# Patient Record
Sex: MALE | Race: WHITE | URBAN - METROPOLITAN AREA
[De-identification: names, ages, dates, MRNs, and addresses within clinical notes are randomized per-mention and may not be internally consistent; named-entity substitution may affect disease eponyms.]

---

## 2020-08-10 ENCOUNTER — OFFICE VISIT (OUTPATIENT)
Dept: VASCULAR SURGERY | Age: 60
End: 2020-08-10
Payer: MEDICAID

## 2020-08-10 VITALS
HEIGHT: 70 IN | RESPIRATION RATE: 18 BRPM | DIASTOLIC BLOOD PRESSURE: 80 MMHG | WEIGHT: 151 LBS | BODY MASS INDEX: 21.62 KG/M2 | SYSTOLIC BLOOD PRESSURE: 130 MMHG

## 2020-08-10 PROBLEM — I87.2 VENOUS INSUFFICIENCY OF BOTH LOWER EXTREMITIES: Status: ACTIVE | Noted: 2020-08-10

## 2020-08-10 PROCEDURE — 3017F COLORECTAL CA SCREEN DOC REV: CPT | Performed by: SURGERY

## 2020-08-10 PROCEDURE — G8420 CALC BMI NORM PARAMETERS: HCPCS | Performed by: SURGERY

## 2020-08-10 PROCEDURE — G8427 DOCREV CUR MEDS BY ELIG CLIN: HCPCS | Performed by: SURGERY

## 2020-08-10 PROCEDURE — 4004F PT TOBACCO SCREEN RCVD TLK: CPT | Performed by: SURGERY

## 2020-08-10 PROCEDURE — 99204 OFFICE O/P NEW MOD 45 MIN: CPT | Performed by: SURGERY

## 2020-08-10 RX ORDER — OXYCODONE AND ACETAMINOPHEN 10; 325 MG/1; MG/1
1 TABLET ORAL EVERY 4 HOURS PRN
COMMUNITY

## 2020-08-10 RX ORDER — OXYCODONE 18 MG/1
CAPSULE, EXTENDED RELEASE ORAL
COMMUNITY

## 2020-08-10 RX ORDER — GABAPENTIN 600 MG/1
600 TABLET ORAL 3 TIMES DAILY
COMMUNITY

## 2020-08-10 RX ORDER — ALBUTEROL SULFATE 90 UG/1
2 AEROSOL, METERED RESPIRATORY (INHALATION) EVERY 6 HOURS PRN
COMMUNITY

## 2020-08-10 NOTE — PROGRESS NOTES
Vascular Surgery Outpatient Consultation    Reason for Consult:  Bilateral LE pain    PCP : Zari Hernandez DO  Podiatrist :   Pulmonologist : MAURICE Suero    HISTORY OF PRESENT ILLNESS:    The patient is a 56 y.o. male who presents in regards to bilateral LE swelling, edema with associate venous stasis changes. The symptoms are left greater than right. Symptoms include pain, aching, fatigue, burning and edema typically worse as on feet more, at end of the day. Pain at its worst is a 10/10. He first noticed the swelling issues about 2016 and it has gotten progressively getting worse. He did take a water pill for a period of time which seemed to help with some swelling. His swelling did get better and they stopped it. The patient has used compression stockings in the past, the copper fit. He felt it was to tight. They do not have a hx of DVT in the past.      He has a history of adenocarcinoma of the lung. He had chemotherapy in the past.  He also had brain metastases which was treated with a gamma knife to the brain stem in July 2016. He has a left sided mediport. He does walk but also uses a wheelchair. He has a lot of difficulty with walking due to pain in his legs. He has chronic back pain which radiates down his legs also which significantly affects his ability to walk.        ROS : All others Negative if blank [], Positive if [x]  General Urinary   [] Fevers [] Hematuria   [] Chills [] Dysuria   [] Weight Loss Vascular   Skin [] Claudication   [] Tissue Loss [] Rest Pain   Eyes Neurologic   [x] Wears Glasses/Contacts [] Stroke/TIA   [] Vision Changes [] Focal weakness   Respiratory [] Slurred Speech    [x] Shortness of breath ENT   Cardiovascular [] Difficulty swallowing   [] Chest Pain Endocrine    [x] Shortness of breath with exertion - on O2 [x] Increased Thirst   Gastrointestinal    [] Abdominal Pain    [] Melena   [] Hematochezia         Past Medical History:        Diagnosis Date    Arthritis     Blood circulation, collateral     Cancer (HCC)     LUNGS, METS TO BRAIN, LIVER, LEGS DX 2016    Chronic kidney disease     COPD (chronic obstructive pulmonary disease) (HCC)     GERD (gastroesophageal reflux disease)     Removed 2 foot if instine    Liver disease     Mets to livewr, lung primary    Lymphadenopathy     Pneumonia     Cancer     Past Surgical History:        Procedure Laterality Date    ABDOMEN SURGERY      APPENDECTOMY      COLON SURGERY  2011    twisted bowel 22 inches small bowel remove    COLONOSCOPY      DILATATION, ESOPHAGUS      ENDOSCOPY, COLON, DIAGNOSTIC      TONSILLECTOMY       Current Medications:   Current Outpatient Medications   Medication Sig Dispense Refill    oxyCODONE-acetaminophen (PERCOCET)  MG per tablet Take 1 tablet by mouth every 4 hours as needed for Pain.  albuterol sulfate HFA (PROAIR HFA) 108 (90 Base) MCG/ACT inhaler Inhale 2 puffs into the lungs every 6 hours as needed for Wheezing      oxyCODONE ER (XTAMPZA ER) 18 MG C12A Take by mouth.  gabapentin (NEURONTIN) 600 MG tablet Take 600 mg by mouth 3 times daily.  magnesium chloride (MAG DELAY) 535 (64 MG) MG TBCR extended release tablet Take 1 tablet by mouth 3 times daily 90 tablet 0    calcitRIOL (ROCALTROL) 0.25 MCG capsule Take 1 capsule by mouth daily 30 capsule 0    vitamin D (ERGOCALCIFEROL) 25577 UNITS capsule Take 1 capsule by mouth once a week 4 capsule 0    Fluticasone Furoate-Vilanterol 200-25 MCG/INH AEPB Inhale into the lungs daily Do am      B Complex Vitamins (VITAMIN B COMPLEX PO) Take by mouth      Multiple Vitamins-Minerals (MULTIVITAMIN ADULT PO) Take by mouth      Ascorbic Acid (VITAMIN C) 500 MG tablet Take 500 mg by mouth daily       No current facility-administered medications for this visit. Allergies:  Patient has no known allergies.   Social History     Socioeconomic History    Marital status: Awake, alert, cooperative  PSYCHIATRIC :  Oriented to time, place and person     Appropriate insight to disease process  EYES: Lids and lashes normal  ENT:  External ears and nose without lesions   Hearing deficits not noted  NECK: Supple, symmetrical, trachea midline   Thyroid goiter not appreciated   Carotid bruit notnoted  LUNGS:  No increased work of breathing                 Clear to auscultation  CARDIOVASCULAR:  regular rate and rhythm   ABDOMEN:  soft, non-distended, non-tender   Hernias notnoted   Aorta is not palpable  Lymphatics : Cervical lymphadenopathy notnoted     Femoral lymphadenopathy nonoted  SKIN:   Normal skin color   Texture and turgor normal, no induration  EXTREMITIES:   R UE 5/5 strength   No cyanosis noted in nail beds  L UE 5/5 strength   No cyanosis noted in nail beds  R LE Edema present   Varicose veins absent   L LE Edema present   Varicose veins absent    Significant amount of dead skin bilateral calves which comes off easily   No open wounds  Bilateral feet swelling worse than legs swelling  R femoral 2+ L femoral 2+   R dorsalis pedis biphasic L dorsalis pedis biphasic   R posterior tibial biphasic L posterior tibial biphasic     RADIOLOGY:    A/PSymptomatic Venous insufficiency of Bilateral LE   · Etiology is likely associated with venous reflux  · I explained to them the pathophysiology of venous reflux and the symptoms associated with it including swelling, pain, edema, heaviness, and even ulceration  · Elevate Bilateral LE while in bed or sitting - difficult because of back issues  · Compression stockings high 8-15 mm hg wear daily - use copper fits which he already have  · Discussed how this is the first line of therapy  · They understand even if surgical intervention was felt to be appropriate in the future they would need to wear compression stockings lifetime  · F/U as outpatient in 2 months  · Call if patient develops worsening of swelling or wounds  · All ?s answered  · Part of his pain is likely caused by his chronic back issues    Meredith Torrez

## 2020-10-09 ENCOUNTER — TELEPHONE (OUTPATIENT)
Dept: VASCULAR SURGERY | Age: 60
End: 2020-10-09

## 2020-10-09 NOTE — TELEPHONE ENCOUNTER
Patient cancelled appt on 10-12-20 with Dr. Tha Sebastian. He does not wish to reschedule at this time.

## 2022-12-30 ENCOUNTER — HOSPITAL ENCOUNTER (EMERGENCY)
Age: 62
Discharge: HOME | End: 2022-12-30
Attending: EMERGENCY MEDICINE
Payer: MEDICAID

## 2022-12-30 ENCOUNTER — APPOINTMENT (OUTPATIENT)
Dept: GENERAL RADIOLOGY | Age: 62
End: 2022-12-30
Payer: MEDICAID

## 2022-12-30 ENCOUNTER — APPOINTMENT (OUTPATIENT)
Dept: CT IMAGING | Age: 62
End: 2022-12-30
Payer: MEDICAID

## 2022-12-30 VITALS
HEART RATE: 90 BPM | RESPIRATION RATE: 16 BRPM | TEMPERATURE: 98.4 F | SYSTOLIC BLOOD PRESSURE: 126 MMHG | OXYGEN SATURATION: 98 % | DIASTOLIC BLOOD PRESSURE: 78 MMHG

## 2022-12-30 DIAGNOSIS — U07.1 COVID: Primary | ICD-10-CM

## 2022-12-30 DIAGNOSIS — J44.1 COPD EXACERBATION (HCC): ICD-10-CM

## 2022-12-30 LAB
ADENOVIRUS BY PCR: NOT DETECTED
ALBUMIN SERPL-MCNC: 4.5 G/DL (ref 3.5–5.2)
ALP BLD-CCNC: 86 U/L (ref 40–129)
ALT SERPL-CCNC: 12 U/L (ref 0–40)
ANION GAP SERPL CALCULATED.3IONS-SCNC: 14 MMOL/L (ref 7–16)
AST SERPL-CCNC: 24 U/L (ref 0–39)
BASOPHILS ABSOLUTE: 0.07 E9/L (ref 0–0.2)
BASOPHILS RELATIVE PERCENT: 1.7 % (ref 0–2)
BILIRUB SERPL-MCNC: 0.3 MG/DL (ref 0–1.2)
BORDETELLA PARAPERTUSSIS BY PCR: NOT DETECTED
BORDETELLA PERTUSSIS BY PCR: NOT DETECTED
BUN BLDV-MCNC: 29 MG/DL (ref 6–23)
CALCIUM SERPL-MCNC: 10.2 MG/DL (ref 8.6–10.2)
CHLAMYDOPHILIA PNEUMONIAE BY PCR: NOT DETECTED
CHLORIDE BLD-SCNC: 96 MMOL/L (ref 98–107)
CO2: 27 MMOL/L (ref 22–29)
CORONAVIRUS 229E BY PCR: NOT DETECTED
CORONAVIRUS HKU1 BY PCR: NOT DETECTED
CORONAVIRUS NL63 BY PCR: NOT DETECTED
CORONAVIRUS OC43 BY PCR: NOT DETECTED
CREAT SERPL-MCNC: 1.5 MG/DL (ref 0.7–1.2)
EKG ATRIAL RATE: 80 BPM
EKG P AXIS: 56 DEGREES
EKG P-R INTERVAL: 148 MS
EKG Q-T INTERVAL: 348 MS
EKG QRS DURATION: 86 MS
EKG QTC CALCULATION (BAZETT): 401 MS
EKG R AXIS: 3 DEGREES
EKG T AXIS: 66 DEGREES
EKG VENTRICULAR RATE: 80 BPM
EOSINOPHILS ABSOLUTE: 0 E9/L (ref 0.05–0.5)
EOSINOPHILS RELATIVE PERCENT: 0.5 % (ref 0–6)
GFR SERPL CREATININE-BSD FRML MDRD: 52 ML/MIN/1.73
GLUCOSE BLD-MCNC: 92 MG/DL (ref 74–99)
HCT VFR BLD CALC: 40.7 % (ref 37–54)
HEMOGLOBIN: 13.3 G/DL (ref 12.5–16.5)
HUMAN METAPNEUMOVIRUS BY PCR: NOT DETECTED
HUMAN RHINOVIRUS/ENTEROVIRUS BY PCR: NOT DETECTED
INFLUENZA A BY PCR: NOT DETECTED
INFLUENZA B BY PCR: NOT DETECTED
LACTIC ACID: 1.5 MMOL/L (ref 0.5–2.2)
LYMPHOCYTES ABSOLUTE: 0.18 E9/L (ref 1.5–4)
LYMPHOCYTES RELATIVE PERCENT: 4.4 % (ref 20–42)
MAGNESIUM: 1.7 MG/DL (ref 1.6–2.6)
MCH RBC QN AUTO: 29.4 PG (ref 26–35)
MCHC RBC AUTO-ENTMCNC: 32.7 % (ref 32–34.5)
MCV RBC AUTO: 89.8 FL (ref 80–99.9)
METAMYELOCYTES RELATIVE PERCENT: 0.9 % (ref 0–1)
MONOCYTES ABSOLUTE: 0.48 E9/L (ref 0.1–0.95)
MONOCYTES RELATIVE PERCENT: 11.3 % (ref 2–12)
MYCOPLASMA PNEUMONIAE BY PCR: NOT DETECTED
NEUTROPHILS ABSOLUTE: 3.65 E9/L (ref 1.8–7.3)
NEUTROPHILS RELATIVE PERCENT: 81.7 % (ref 43–80)
PARAINFLUENZA VIRUS 1 BY PCR: NOT DETECTED
PARAINFLUENZA VIRUS 2 BY PCR: NOT DETECTED
PARAINFLUENZA VIRUS 3 BY PCR: NOT DETECTED
PARAINFLUENZA VIRUS 4 BY PCR: NOT DETECTED
PDW BLD-RTO: 13.6 FL (ref 11.5–15)
PLATELET # BLD: 243 E9/L (ref 130–450)
PMV BLD AUTO: 9.1 FL (ref 7–12)
POTASSIUM SERPL-SCNC: 4.8 MMOL/L (ref 3.5–5)
PRO-BNP: 970 PG/ML (ref 0–125)
RBC # BLD: 4.53 E12/L (ref 3.8–5.8)
REASON FOR REJECTION: NORMAL
REJECTED TEST: NORMAL
RESPIRATORY SYNCYTIAL VIRUS BY PCR: NOT DETECTED
SARS-COV-2, PCR: DETECTED
SODIUM BLD-SCNC: 137 MMOL/L (ref 132–146)
TOTAL PROTEIN: 7.7 G/DL (ref 6.4–8.3)
TROPONIN, HIGH SENSITIVITY: 21 NG/L (ref 0–11)
TROPONIN, HIGH SENSITIVITY: 24 NG/L (ref 0–11)
WBC # BLD: 4.4 E9/L (ref 4.5–11.5)

## 2022-12-30 PROCEDURE — 94664 DEMO&/EVAL PT USE INHALER: CPT

## 2022-12-30 PROCEDURE — 83880 ASSAY OF NATRIURETIC PEPTIDE: CPT

## 2022-12-30 PROCEDURE — 80053 COMPREHEN METABOLIC PANEL: CPT

## 2022-12-30 PROCEDURE — 6370000000 HC RX 637 (ALT 250 FOR IP): Performed by: STUDENT IN AN ORGANIZED HEALTH CARE EDUCATION/TRAINING PROGRAM

## 2022-12-30 PROCEDURE — 83735 ASSAY OF MAGNESIUM: CPT

## 2022-12-30 PROCEDURE — 6360000004 HC RX CONTRAST MEDICATION: Performed by: RADIOLOGY

## 2022-12-30 PROCEDURE — 84484 ASSAY OF TROPONIN QUANT: CPT

## 2022-12-30 PROCEDURE — 71275 CT ANGIOGRAPHY CHEST: CPT

## 2022-12-30 PROCEDURE — 99285 EMERGENCY DEPT VISIT HI MDM: CPT

## 2022-12-30 PROCEDURE — 36415 COLL VENOUS BLD VENIPUNCTURE: CPT

## 2022-12-30 PROCEDURE — 85025 COMPLETE CBC W/AUTO DIFF WBC: CPT

## 2022-12-30 PROCEDURE — 71046 X-RAY EXAM CHEST 2 VIEWS: CPT

## 2022-12-30 PROCEDURE — 93005 ELECTROCARDIOGRAM TRACING: CPT | Performed by: PHYSICIAN ASSISTANT

## 2022-12-30 PROCEDURE — 83605 ASSAY OF LACTIC ACID: CPT

## 2022-12-30 PROCEDURE — 0202U NFCT DS 22 TRGT SARS-COV-2: CPT

## 2022-12-30 RX ORDER — PREDNISONE 50 MG/1
50 TABLET ORAL DAILY
Qty: 5 TABLET | Refills: 0 | Status: SHIPPED | OUTPATIENT
Start: 2022-12-30 | End: 2023-01-04

## 2022-12-30 RX ORDER — IPRATROPIUM BROMIDE AND ALBUTEROL SULFATE 2.5; .5 MG/3ML; MG/3ML
3 SOLUTION RESPIRATORY (INHALATION) ONCE
Status: COMPLETED | OUTPATIENT
Start: 2022-12-30 | End: 2022-12-30

## 2022-12-30 RX ORDER — PREDNISONE 20 MG/1
50 TABLET ORAL ONCE
Status: COMPLETED | OUTPATIENT
Start: 2022-12-30 | End: 2022-12-30

## 2022-12-30 RX ADMIN — IOPAMIDOL 60 ML: 755 INJECTION, SOLUTION INTRAVENOUS at 19:08

## 2022-12-30 RX ADMIN — IPRATROPIUM BROMIDE AND ALBUTEROL SULFATE 3 AMPULE: 2.5; .5 SOLUTION RESPIRATORY (INHALATION) at 14:35

## 2022-12-30 RX ADMIN — PREDNISONE 50 MG: 20 TABLET ORAL at 15:57

## 2022-12-30 ASSESSMENT — ENCOUNTER SYMPTOMS
RHINORRHEA: 0
SINUS PAIN: 0
ANAL BLEEDING: 0
ABDOMINAL DISTENTION: 0
EYE DISCHARGE: 0
BACK PAIN: 0
SINUS PRESSURE: 0
ABDOMINAL PAIN: 0
CONSTIPATION: 0
SHORTNESS OF BREATH: 1
VOMITING: 0
CHEST TIGHTNESS: 0
DIARRHEA: 0
COUGH: 1
NAUSEA: 0

## 2022-12-30 NOTE — ED PROVIDER NOTES
HPI     Patient is a 57 y.o. male presents with a chief complaint of shortness of breath  This has been occurring for one day. Patient states that it gets better with nothing. Patient states that it gets worse with nothing. Patient states that it is moderate in severity. Patient states it was acute in onset. Patient presents with shortness of breath. Patient was recently exposed to [employer] over the holiday. Patient states that he has stage IV lung cancer. Patient notes that he is in remission. Patient is chronically on 4 L. Not coughing up blood. New cough. Patient has no changes in urinary or bowel habits. Patient denies any abdominal pain. Patient has mild chest pain earlier that is completely resolved. Review of Systems   Constitutional:  Negative for activity change, appetite change, fatigue and fever. HENT:  Negative for congestion, rhinorrhea, sinus pressure and sinus pain. Eyes:  Negative for discharge. Respiratory:  Positive for cough and shortness of breath. Negative for chest tightness. Cardiovascular:  Positive for chest pain. Negative for palpitations. Gastrointestinal:  Negative for abdominal distention, abdominal pain, anal bleeding, constipation, diarrhea, nausea and vomiting. Endocrine: Negative for polydipsia and polyuria. Genitourinary:  Negative for decreased urine volume, difficulty urinating, enuresis, flank pain, frequency and urgency. Musculoskeletal:  Negative for arthralgias, back pain and neck stiffness. Skin:  Negative for rash and wound. Neurological:  Negative for dizziness, weakness, light-headedness and headaches. Psychiatric/Behavioral:  Negative for agitation, behavioral problems and confusion. Physical Exam  Vitals and nursing note reviewed. Constitutional:       Appearance: He is well-developed. HENT:      Head: Normocephalic and atraumatic.    Eyes:      Conjunctiva/sclera: Conjunctivae normal.   Cardiovascular:      Rate and Rhythm: Normal rate and regular rhythm. Heart sounds: Normal heart sounds. No murmur heard. Pulmonary:      Effort: Pulmonary effort is normal. No respiratory distress. Breath sounds: Normal breath sounds. No wheezing or rales. Abdominal:      General: Bowel sounds are normal.      Palpations: Abdomen is soft. Tenderness: There is no abdominal tenderness. There is no guarding or rebound. Musculoskeletal:         General: No tenderness or deformity. Cervical back: Normal range of motion and neck supple. Skin:     General: Skin is warm and dry. Neurological:      Mental Status: He is alert and oriented to person, place, and time. Cranial Nerves: No cranial nerve deficit. Coordination: Coordination normal.        Procedures     MDM     Amount and/or Complexity of Data Reviewed  Decide to obtain previous medical records or to obtain history from someone other than the patient: yes         ED Course as of 12/31/22 0958   Fri Dec 30, 2022   1316 EKG: This EKG is signed and interpreted by me, Dr. Charley Horton MD    Rate: 80  Rhythm: Sinus  Interpretation: Normal sinus rhythm, normal OH interval, normal QRS, normal axis, normal QT interval, no acute ST or T wave changes  Comparison: no previous EKG available   [JA]   1617 Patient is a 25-year-old male presenting with a 1 day history of cough and shortness of breath. He has a remote history of lung cancer, currently in remission. He is on 4 L of oxygen at baseline. He denies any chest pain, hemoptysis, syncope, abdominal pain, emesis, diarrhea. Patient is positive for COVID-19. He was hemodynamically stable, saturating wellon oxygen in no acute respiratory distress on my examination. Saturating well on his baseline oxygen. Work-up is pending. [JA]   2106 Discussed results with patient, including finding of small pleural effusion. Patient is aware of this and states his pulmonologist is aware of this as well.   He has pulse ox at home and was instructed to monitor his SPO2. He was discharged home with strict return precautions. [AP]      ED Course User Index  [AP] Sparkle Schaeffer MD  [JA] Sharmin Silverman MD      Patient is a 57 y.o. male presenting with shortness of breath. Patient has a history of lung cancer. Patient will get a CTA pulm. Patient's x-ray was benign. EKG was benign. Patient was on his home liter oxygen. Patient was ambulated on oxygen and did not become hypoxic. Patient's delta troponin was 3. Patient was given duo nebs and prednisone. Patient did have improvement of symptoms. Patient was signed off to oncoming physician. Patient CT of the chest showed some emphysema as well as pulmonary nodules. Patient will be discharged home    Patient was given return precautions. Labs were interpreted by me. Patient will follow up with their primary care provider. Patient is agreeable to this plan. Patient has remained stable throughout their stay in the ED. Patient was seen and evaluated by myself and my attending Sharmin Silverman MD. Assessment and Plan discussed with attending provider, please see attestation for final plan of care. This note was done using dictation software and there may be some grammatical errors associated with this. Jean Pierre Salinas MD      ED Course as of 12/31/22 1000   Fri Dec 30, 2022   1316 EKG: This EKG is signed and interpreted by me, Dr. Ruthie Argueta MD    Rate: 80  Rhythm: Sinus  Interpretation: Normal sinus rhythm, normal RI interval, normal QRS, normal axis, normal QT interval, no acute ST or T wave changes  Comparison: no previous EKG available   [JA]   1613 Patient is a 22-year-old male presenting with a 1 day history of cough and shortness of breath. He has a remote history of lung cancer, currently in remission. He is on 4 L of oxygen at baseline. He denies any chest pain, hemoptysis, syncope, abdominal pain, emesis, diarrhea.   Patient is positive for COVID-19. He was hemodynamically stable, saturating wellon oxygen in no acute respiratory distress on my examination. Saturating well on his baseline oxygen. Work-up is pending. [JA]   2435 Discussed results with patient, including finding of small pleural effusion. Patient is aware of this and states his pulmonologist is aware of this as well. He has pulse ox at home and was instructed to monitor his SPO2. He was discharged home with strict return precautions. [AP]      ED Course User Index  [AP] Jie Monsivais MD  [JA] Sofie Maynard MD       --------------------------------------------- PAST HISTORY ---------------------------------------------  Past Medical History:  has a past medical history of Arthritis, Blood circulation, collateral, Cancer ([facility] [address] Chronic kidney disease, [facility] GERD (gastroesophageal reflux disease), Liver disease, Lymphadenopathy, and Pneumonia. Past Surgical History:  has a past surgical history that includes Colon surgery (2011); Colonoscopy; Tonsillectomy; Abdomen surgery; Appendectomy; Endoscopy, colon, diagnostic; and Dilatation, esophagus. Social History:  reports that he has been smoking. He has a 40.00 pack-year smoking history. He has never used smokeless tobacco. He reports current alcohol use of about 28.0 standard drinks per week. He reports that he does not use drugs. Family History: family history is not on file. The patients home medications have been reviewed. Allergies: Patient has no known allergies.     -------------------------------------------------- RESULTS -------------------------------------------------  Labs:  Results for orders placed or performed during the hospital encounter of 12/30/22   Respiratory Panel, Molecular, with COVID-19 (Restricted: peds pts or suitable admitted adults)   Result Value Ref Range    Adenovirus by PCR Not Detected Not Detected    Bordetella parapertussis by PCR Not Detected Not Detected    Bordetella pertussis by PCR Not Detected Not Detected    Chlamydophilia pneumoniae by PCR Not Detected Not Detected    Coronavirus 229E by PCR Not Detected Not Detected    Coronavirus HKU1 by PCR Not Detected Not Detected    Coronavirus NL63 by PCR Not Detected Not Detected    Coronavirus OC43 by PCR Not Detected Not Detected    SARS-CoV-2, PCR DETECTED (A) Not Detected    Human Metapneumovirus by PCR Not Detected Not Detected    Human Rhinovirus/Enterovirus by PCR Not Detected Not Detected    Influenza A by PCR Not Detected Not Detected    Influenza B by PCR Not Detected Not Detected    Mycoplasma pneumoniae by PCR Not Detected Not Detected    Parainfluenza Virus 1 by PCR Not Detected Not Detected    Parainfluenza Virus 2 by PCR Not Detected Not Detected    Parainfluenza Virus 3 by PCR Not Detected Not Detected    Parainfluenza Virus 4 by PCR Not Detected Not Detected    Respiratory Syncytial Virus by PCR Not Detected Not Detected   CBC with Auto Differential   Result Value Ref Range    WBC 4.4 (L) 4.5 - 11.5 E9/L    RBC 4.53 3.80 - 5.80 E12/L    Hemoglobin 13.3 12.5 - 16.5 g/dL    Hematocrit 40.7 37.0 - 54.0 %    MCV 89.8 80.0 - 99.9 fL    MCH 29.4 26.0 - 35.0 pg    MCHC 32.7 32.0 - 34.5 %    RDW 13.6 11.5 - 15.0 fL    Platelets 402 073 - 180 E9/L    MPV 9.1 7.0 - 12.0 fL    Neutrophils % 81.7 (H) 43.0 - 80.0 %    Lymphocytes % 4.4 (L) 20.0 - 42.0 %    Monocytes % 11.3 2.0 - 12.0 %    Eosinophils % 0.5 0.0 - 6.0 %    Basophils % 1.7 0.0 - 2.0 %    Neutrophils Absolute 3.65 1.80 - 7.30 E9/L    Lymphocytes Absolute 0.18 (L) 1.50 - 4.00 E9/L    Monocytes Absolute 0.48 0.10 - 0.95 E9/L    Eosinophils Absolute 0.00 (L) 0.05 - 0.50 E9/L    Basophils Absolute 0.07 0.00 - 0.20 E9/L    Metamyelocytes Relative 0.9 0.0 - 1.0 %   Comprehensive Metabolic Panel   Result Value Ref Range    Sodium 137 132 - 146 mmol/L    Potassium 4.8 3.5 - 5.0 mmol/L    Chloride 96 (L) 98 - 107 mmol/L    CO2 27 22 - 29 mmol/L    Anion Gap 14 7 - 16 mmol/L    Glucose 92 74 - 99 mg/dL    BUN 29 (H) 6 - 23 mg/dL    Creatinine 1.5 (H) 0.7 - 1.2 mg/dL    Est, Glom Filt Rate 52 >=60 mL/min/1.73    Calcium 10.2 8.6 - 10.2 mg/dL    Total Protein 7.7 6.4 - 8.3 g/dL    Albumin 4.5 3.5 - 5.2 g/dL    Total Bilirubin 0.3 0.0 - 1.2 mg/dL    Alkaline Phosphatase 86 40 - 129 U/L    ALT 12 0 - 40 U/L    AST 24 0 - 39 U/L   Troponin   Result Value Ref Range    Troponin, High Sensitivity 24 (H) 0 - 11 ng/L   Lactic Acid   Result Value Ref Range    Lactic Acid 1.5 0.5 - 2.2 mmol/L   Brain Natriuretic Peptide   Result Value Ref Range    Pro- (H) 0 - 125 pg/mL   Magnesium   Result Value Ref Range    Magnesium 1.7 1.6 - 2.6 mg/dL   SPECIMEN REJECTION   Result Value Ref Range    Rejected Test covrg     Reason for Rejection see below    Troponin   Result Value Ref Range    Troponin, High Sensitivity 21 (H) 0 - 11 ng/L   EKG 12 Lead   Result Value Ref Range    Ventricular Rate 80 BPM    Atrial Rate 80 BPM    P-R Interval 148 ms    QRS Duration 86 ms    Q-T Interval 348 ms    QTc Calculation (Bazett) 401 ms    P Axis 56 degrees    R Axis 3 degrees    T Axis 66 degrees       Radiology:  CTA PULMONARY W CONTRAST   Final Result   No evidence of pulmonary embolism. Severe emphysema and chronic changes with probably a state resection is prior   resection right lung apex. No suspicious pulmonary nodule or mass to suggest   recurrent metastatic disease however the minimal septal thickening with   loculated small right and trace to small left pleural effusions may be   related to a interstitial edema pattern or fluid overload. XR CHEST (2 VW)   Final Result   No acute process. Severe scoliosis of the spine. Infusion port in place.              ------------------------- NURSING NOTES AND VITALS REVIEWED ---------------------------  Date / Time Roomed:  12/30/2022 11:58 AM  ED Bed Assignment:  33/05    The nursing notes within the ED encounter and vital signs as below have been reviewed. /78   Pulse 90   Temp 98.4 °F (36.9 °C)   Resp 16   SpO2 98%   Oxygen Saturation Interpretation: Normal      ------------------------------------------ PROGRESS NOTES ------------------------------------------  10:00 AM EST  I have spoken with the patient and family if present and discussed todays results, in addition to providing specific details for the plan of care and counseling regarding the diagnosis and prognosis. Their questions are answered at this time and they are agreeable with the plan. I discussed at length with them reasons for immediate return here for re evaluation. They will followup with their primary care provider by calling their office as soon as possible.      --------------------------------- ADDITIONAL PROVIDER NOTES ---------------------------------  At this time the patient is without objective evidence of an acute process requiring hospitalization or inpatient management. They have remained hemodynamically stable throughout their entire ED visit and are stable for discharge with outpatient follow-up. The plan has been discussed in detail and they are aware of the specific conditions for emergent return, as well as the importance of follow-up. Discharge Medication List as of 12/30/2022  8:56 PM        START taking these medications    Details   predniSONE (DELTASONE) 50 MG tablet Take 1 tablet by mouth daily for 5 days, Disp-5 tablet, R-0Normal             Diagnosis:  1. COVID    2. COPD exacerbation ([facility] [address]        Disposition:  Patient's disposition: Discharge to home  Patient's condition is stable.         Jhonatan Buenrostro MD  Resident  12/31/22 1000

## 2022-12-30 NOTE — ED NOTES
56 y/o male/female to the ed with a c/c of acute onset of SOB. Patient noted with stage IV lung CA, reports his \"normal\" SOB has been getting worse. Patient reports a + COVID home test.  Patient denies chest pain. Patient denies ABD pain, n/v/d or fever. Patient noted with + msp x 4, pupils PERRLA @ 3 and lung sounds that are clear and equil bilaterally. Patient placed on telemetry, BP and pulse ox. 12-lead EKG performed. Vitals noted as recorded. PIV placed with labs drawn and sent. Call light placed within patient's reach, and bed in lowest position with side rails up x 2 for safety. Provider at the bedside for assessment.         Antonieta Garcia RN  12/30/22 8519

## 2022-12-30 NOTE — ED NOTES
The following labs were labeled with appropriate pt sticker and tubed to lab:     [] Blue     [x] Lavender   [] on ice  [x] Green/yellow  [] Green/black [] on ice  [x] Grey  [x] on ice  [] Yellow  [] Red  [] Type/ Screen  [] ABG  [] VBG    [] COVID-19 swab    [] Rapid  [] PCR  [] Flu swab  [x] Peds Viral Panel     [] Urine Sample  [] Fecal Sample  [] Pelvic Cultures  [] Blood Cultures  [] X 2  [] STREP Cultures         Colon MARISA Han  12/30/22 1212

## 2022-12-31 NOTE — ED NOTES
Ambulated patient in room, SpO2 97-98%. No distress noted while ambulating.       Rodolfo Lugo RN  12/30/22 2023

## 2023-03-21 ENCOUNTER — OFFICE VISIT (OUTPATIENT)
Dept: NEUROSURGERY | Age: 63
End: 2023-03-21
Payer: MEDICAID

## 2023-03-21 VITALS
SYSTOLIC BLOOD PRESSURE: 162 MMHG | DIASTOLIC BLOOD PRESSURE: 78 MMHG | OXYGEN SATURATION: 96 % | HEART RATE: 92 BPM | HEIGHT: 70 IN | BODY MASS INDEX: 21.62 KG/M2 | WEIGHT: 151 LBS | TEMPERATURE: 98.2 F

## 2023-03-21 DIAGNOSIS — G89.29 CHRONIC MIDLINE LOW BACK PAIN WITH BILATERAL SCIATICA: Primary | ICD-10-CM

## 2023-03-21 DIAGNOSIS — M54.41 CHRONIC MIDLINE LOW BACK PAIN WITH BILATERAL SCIATICA: Primary | ICD-10-CM

## 2023-03-21 DIAGNOSIS — M54.42 CHRONIC MIDLINE LOW BACK PAIN WITH BILATERAL SCIATICA: Primary | ICD-10-CM

## 2023-03-21 PROCEDURE — G8420 CALC BMI NORM PARAMETERS: HCPCS | Performed by: NEUROLOGICAL SURGERY

## 2023-03-21 PROCEDURE — G8484 FLU IMMUNIZE NO ADMIN: HCPCS | Performed by: NEUROLOGICAL SURGERY

## 2023-03-21 PROCEDURE — G8427 DOCREV CUR MEDS BY ELIG CLIN: HCPCS | Performed by: NEUROLOGICAL SURGERY

## 2023-03-21 PROCEDURE — 99202 OFFICE O/P NEW SF 15 MIN: CPT

## 2023-03-21 PROCEDURE — 4004F PT TOBACCO SCREEN RCVD TLK: CPT | Performed by: NEUROLOGICAL SURGERY

## 2023-03-21 PROCEDURE — 3017F COLORECTAL CA SCREEN DOC REV: CPT | Performed by: NEUROLOGICAL SURGERY

## 2023-03-21 PROCEDURE — 99204 OFFICE O/P NEW MOD 45 MIN: CPT | Performed by: NEUROLOGICAL SURGERY

## 2023-03-21 RX ORDER — IBUPROFEN 800 MG
TABLET ORAL
COMMUNITY

## 2023-03-21 ASSESSMENT — ENCOUNTER SYMPTOMS
RESPIRATORY NEGATIVE: 1
EYES NEGATIVE: 1
BACK PAIN: 1
GASTROINTESTINAL NEGATIVE: 1
ALLERGIC/IMMUNOLOGIC NEGATIVE: 1

## 2023-03-22 NOTE — PROGRESS NOTES
results and face to face with the patient discussing the diagnosis and importance of compliance with the treatment plan as well as documenting on the day of the visit. An electronic signature was used to authenticate this note.     --Kai Chirinos MD

## 2023-06-30 ENCOUNTER — TELEPHONE (OUTPATIENT)
Dept: NEUROSURGERY | Age: 63
End: 2023-06-30

## 2023-09-05 ENCOUNTER — TELEPHONE (OUTPATIENT)
Dept: NEUROSURGERY | Age: 63
End: 2023-09-05

## 2023-09-08 ENCOUNTER — TELEPHONE (OUTPATIENT)
Dept: PALLATIVE CARE | Age: 63
End: 2023-09-08

## 2023-09-08 NOTE — TELEPHONE ENCOUNTER
Declined referral from [facility] as patient has chronic back pain not related to his cancer. Patient would be better served by Pain management. Spoke with Radha Raymond, Dr. Shaheen Rushing staff and notified her of reasons we will not take patient. Radha Raymond states he will Dr. Meme Atwood know.

## 2023-10-05 ENCOUNTER — TELEPHONE (OUTPATIENT)
Dept: NEUROSURGERY | Age: 63
End: 2023-10-05

## 2023-10-05 DIAGNOSIS — M54.42 CHRONIC BILATERAL LOW BACK PAIN WITH BILATERAL SCIATICA: Primary | ICD-10-CM

## 2023-10-05 DIAGNOSIS — G89.29 CHRONIC BILATERAL LOW BACK PAIN WITH BILATERAL SCIATICA: Primary | ICD-10-CM

## 2023-10-05 DIAGNOSIS — M54.41 CHRONIC BILATERAL LOW BACK PAIN WITH BILATERAL SCIATICA: Primary | ICD-10-CM

## 2023-10-05 NOTE — TELEPHONE ENCOUNTER
Received a call from Dr Luz Chan office, they want to know where else to refer this patient to for pain, he declined doctors pain management  I read the letter to the staff but they did not believe me  also they sent him to [facility] and he was seen once and they refuse to see him anymore due to being difficult, they also tried referring him to palliative care who also declined saying his pain is not associated with his cancer,  where else can we try to send for pain

## 2023-10-06 ENCOUNTER — TELEPHONE (OUTPATIENT)
Dept: PALLATIVE CARE | Age: 63
End: 2023-10-06

## 2023-10-06 NOTE — TELEPHONE ENCOUNTER
Call from [facility] with [facility]. [facility] is asking for clarification of why Palliative Care will not manage patient;'s pain. Explained that patient had Chronic pain and it was not related to his cancer. Patient would be better served with Pain management. Nadia verbalizes understanding.

## 2024-07-12 ENCOUNTER — HOSPITAL ENCOUNTER (INPATIENT)
Age: 64
LOS: 1 days | Discharge: HOME | End: 2024-07-12
Attending: EMERGENCY MEDICINE | Admitting: INTERNAL MEDICINE
Payer: MEDICAID

## 2024-07-12 ENCOUNTER — APPOINTMENT (OUTPATIENT)
Dept: GENERAL RADIOLOGY | Age: 64
End: 2024-07-12
Payer: MEDICAID

## 2024-07-12 VITALS
TEMPERATURE: 98.2 F | HEART RATE: 83 BPM | BODY MASS INDEX: 19.6 KG/M2 | SYSTOLIC BLOOD PRESSURE: 126 MMHG | DIASTOLIC BLOOD PRESSURE: 80 MMHG | WEIGHT: 140 LBS | RESPIRATION RATE: 20 BRPM | HEIGHT: 71 IN | OXYGEN SATURATION: 100 %

## 2024-07-12 DIAGNOSIS — R11.0 NAUSEA: Primary | ICD-10-CM

## 2024-07-12 PROBLEM — R62.7 FAILURE TO THRIVE IN ADULT: Status: ACTIVE | Noted: 2024-07-12

## 2024-07-12 LAB
ALBUMIN SERPL-MCNC: 3.4 G/DL (ref 3.5–5.2)
ALP SERPL-CCNC: 82 U/L (ref 40–129)
ALT SERPL-CCNC: 7 U/L (ref 0–40)
ANION GAP SERPL CALCULATED.3IONS-SCNC: 9 MMOL/L (ref 7–16)
AST SERPL-CCNC: 23 U/L (ref 0–39)
BASOPHILS # BLD: 0.12 K/UL (ref 0–0.2)
BASOPHILS NFR BLD: 1 % (ref 0–2)
BILIRUB DIRECT SERPL-MCNC: <0.2 MG/DL (ref 0–0.3)
BILIRUB INDIRECT SERPL-MCNC: ABNORMAL MG/DL (ref 0–1)
BILIRUB SERPL-MCNC: 0.2 MG/DL (ref 0–1.2)
BUN SERPL-MCNC: 23 MG/DL (ref 6–23)
CALCIUM SERPL-MCNC: 10.2 MG/DL (ref 8.6–10.2)
CHLORIDE SERPL-SCNC: 97 MMOL/L (ref 98–107)
CO2 SERPL-SCNC: 30 MMOL/L (ref 22–29)
CREAT SERPL-MCNC: 1.2 MG/DL (ref 0.7–1.2)
EOSINOPHIL # BLD: 0.07 K/UL (ref 0.05–0.5)
EOSINOPHILS RELATIVE PERCENT: 1 % (ref 0–6)
ERYTHROCYTE [DISTWIDTH] IN BLOOD BY AUTOMATED COUNT: 12.7 % (ref 11.5–15)
GFR, ESTIMATED: 65 ML/MIN/1.73M2
GLUCOSE SERPL-MCNC: 149 MG/DL (ref 74–99)
HCT VFR BLD AUTO: 37.9 % (ref 37–54)
HGB BLD-MCNC: 12.1 G/DL (ref 12.5–16.5)
IMM GRANULOCYTES # BLD AUTO: 0.06 K/UL (ref 0–0.58)
IMM GRANULOCYTES NFR BLD: 1 % (ref 0–5)
LACTATE BLDV-SCNC: 1.7 MMOL/L (ref 0.5–2.2)
LIPASE SERPL-CCNC: 18 U/L (ref 13–60)
LYMPHOCYTES NFR BLD: 1.04 K/UL (ref 1.5–4)
LYMPHOCYTES RELATIVE PERCENT: 9 % (ref 20–42)
MCH RBC QN AUTO: 29.9 PG (ref 26–35)
MCHC RBC AUTO-ENTMCNC: 31.9 G/DL (ref 32–34.5)
MCV RBC AUTO: 93.6 FL (ref 80–99.9)
MONOCYTES NFR BLD: 0.64 K/UL (ref 0.1–0.95)
MONOCYTES NFR BLD: 6 % (ref 2–12)
NEUTROPHILS NFR BLD: 83 % (ref 43–80)
NEUTS SEG NFR BLD: 9.09 K/UL (ref 1.8–7.3)
PLATELET # BLD AUTO: 370 K/UL (ref 130–450)
PMV BLD AUTO: 9.7 FL (ref 7–12)
POTASSIUM SERPL-SCNC: 5.5 MMOL/L (ref 3.5–5)
PROT SERPL-MCNC: 6.9 G/DL (ref 6.4–8.3)
RBC # BLD AUTO: 4.05 M/UL (ref 3.8–5.8)
SODIUM SERPL-SCNC: 136 MMOL/L (ref 132–146)
WBC OTHER # BLD: 11 K/UL (ref 4.5–11.5)

## 2024-07-12 PROCEDURE — 85025 COMPLETE CBC W/AUTO DIFF WBC: CPT

## 2024-07-12 PROCEDURE — 82248 BILIRUBIN DIRECT: CPT

## 2024-07-12 PROCEDURE — 96361 HYDRATE IV INFUSION ADD-ON: CPT

## 2024-07-12 PROCEDURE — 96360 HYDRATION IV INFUSION INIT: CPT

## 2024-07-12 PROCEDURE — 99284 EMERGENCY DEPT VISIT MOD MDM: CPT

## 2024-07-12 PROCEDURE — 83605 ASSAY OF LACTIC ACID: CPT

## 2024-07-12 PROCEDURE — 1200000000 HC SEMI PRIVATE

## 2024-07-12 PROCEDURE — 71045 X-RAY EXAM CHEST 1 VIEW: CPT

## 2024-07-12 PROCEDURE — 83690 ASSAY OF LIPASE: CPT

## 2024-07-12 PROCEDURE — 80053 COMPREHEN METABOLIC PANEL: CPT

## 2024-07-12 PROCEDURE — 2580000003 HC RX 258: Performed by: EMERGENCY MEDICINE

## 2024-07-12 RX ORDER — SODIUM CHLORIDE 9 MG/ML
INJECTION, SOLUTION INTRAVENOUS PRN
Status: DISCONTINUED | OUTPATIENT
Start: 2024-07-12 | End: 2024-07-12 | Stop reason: HOSPADM

## 2024-07-12 RX ORDER — MAGNESIUM SULFATE 1 G/100ML
1000 INJECTION INTRAVENOUS PRN
Status: DISCONTINUED | OUTPATIENT
Start: 2024-07-12 | End: 2024-07-12 | Stop reason: HOSPADM

## 2024-07-12 RX ORDER — ONDANSETRON 4 MG/1
4 TABLET, FILM COATED ORAL EVERY 8 HOURS PRN
Qty: 20 TABLET | Refills: 0 | Status: SHIPPED | OUTPATIENT
Start: 2024-07-12

## 2024-07-12 RX ORDER — SODIUM CHLORIDE 0.9 % (FLUSH) 0.9 %
10 SYRINGE (ML) INJECTION PRN
Status: DISCONTINUED | OUTPATIENT
Start: 2024-07-12 | End: 2024-07-12 | Stop reason: HOSPADM

## 2024-07-12 RX ORDER — ALBUTEROL SULFATE 90 UG/1
2 AEROSOL, METERED RESPIRATORY (INHALATION) EVERY 6 HOURS PRN
Status: DISCONTINUED | OUTPATIENT
Start: 2024-07-12 | End: 2024-07-12 | Stop reason: CLARIF

## 2024-07-12 RX ORDER — POTASSIUM CHLORIDE 7.45 MG/ML
10 INJECTION INTRAVENOUS PRN
Status: DISCONTINUED | OUTPATIENT
Start: 2024-07-12 | End: 2024-07-12 | Stop reason: HOSPADM

## 2024-07-12 RX ORDER — GABAPENTIN 600 MG/1
600 TABLET ORAL 3 TIMES DAILY
Status: DISCONTINUED | OUTPATIENT
Start: 2024-07-12 | End: 2024-07-12 | Stop reason: HOSPADM

## 2024-07-12 RX ORDER — SODIUM CHLORIDE 9 MG/ML
INJECTION, SOLUTION INTRAVENOUS CONTINUOUS
Status: DISCONTINUED | OUTPATIENT
Start: 2024-07-12 | End: 2024-07-12 | Stop reason: HOSPADM

## 2024-07-12 RX ORDER — NICOTINE 21 MG/24HR
1 PATCH, TRANSDERMAL 24 HOURS TRANSDERMAL ONCE
Status: DISCONTINUED | OUTPATIENT
Start: 2024-07-12 | End: 2024-07-12 | Stop reason: HOSPADM

## 2024-07-12 RX ORDER — ONDANSETRON 2 MG/ML
4 INJECTION INTRAMUSCULAR; INTRAVENOUS EVERY 6 HOURS PRN
Status: DISCONTINUED | OUTPATIENT
Start: 2024-07-12 | End: 2024-07-12 | Stop reason: HOSPADM

## 2024-07-12 RX ORDER — FENTANYL CITRATE 50 UG/ML
50 INJECTION, SOLUTION INTRAMUSCULAR; INTRAVENOUS ONCE
Status: DISCONTINUED | OUTPATIENT
Start: 2024-07-12 | End: 2024-07-12 | Stop reason: HOSPADM

## 2024-07-12 RX ORDER — OXYCODONE AND ACETAMINOPHEN 10; 325 MG/1; MG/1
1 TABLET ORAL EVERY 4 HOURS PRN
Status: DISCONTINUED | OUTPATIENT
Start: 2024-07-12 | End: 2024-07-12 | Stop reason: HOSPADM

## 2024-07-12 RX ORDER — ONDANSETRON 4 MG/1
4 TABLET, FILM COATED ORAL EVERY 8 HOURS PRN
Qty: 20 TABLET | Refills: 0 | Status: SHIPPED | OUTPATIENT
Start: 2024-07-12 | End: 2024-07-12

## 2024-07-12 RX ORDER — ONDANSETRON 4 MG/1
4 TABLET, ORALLY DISINTEGRATING ORAL EVERY 8 HOURS PRN
Status: DISCONTINUED | OUTPATIENT
Start: 2024-07-12 | End: 2024-07-12 | Stop reason: HOSPADM

## 2024-07-12 RX ORDER — SODIUM CHLORIDE 0.9 % (FLUSH) 0.9 %
5-40 SYRINGE (ML) INJECTION EVERY 12 HOURS SCHEDULED
Status: DISCONTINUED | OUTPATIENT
Start: 2024-07-12 | End: 2024-07-12 | Stop reason: HOSPADM

## 2024-07-12 RX ORDER — ALBUTEROL SULFATE 2.5 MG/3ML
2.5 SOLUTION RESPIRATORY (INHALATION) EVERY 6 HOURS PRN
Status: DISCONTINUED | OUTPATIENT
Start: 2024-07-12 | End: 2024-07-12 | Stop reason: HOSPADM

## 2024-07-12 RX ORDER — 0.9 % SODIUM CHLORIDE 0.9 %
1000 INTRAVENOUS SOLUTION INTRAVENOUS ONCE
Status: COMPLETED | OUTPATIENT
Start: 2024-07-12 | End: 2024-07-12

## 2024-07-12 RX ADMIN — SODIUM CHLORIDE: 9 INJECTION, SOLUTION INTRAVENOUS at 12:41

## 2024-07-12 RX ADMIN — SODIUM CHLORIDE 1000 ML: 9 INJECTION, SOLUTION INTRAVENOUS at 11:47

## 2024-07-12 ASSESSMENT — LIFESTYLE VARIABLES
HOW OFTEN DO YOU HAVE A DRINK CONTAINING ALCOHOL: NEVER
HOW MANY STANDARD DRINKS CONTAINING ALCOHOL DO YOU HAVE ON A TYPICAL DAY: PATIENT DOES NOT DRINK

## 2024-07-12 NOTE — ED PROVIDER NOTES
Department of Emergency Medicine   ED  Provider Note  Admit Date/RoomTime: 7/12/2024 11:28 AM  ED Room: [facility]          History of Present Illness:  7/12/24, Time: 1:42 PM EDT  Chief Complaint   Patient presents with    Nausea     Patient unable to keep food down. Hx TYRONE Alarcon is a 63 y.o. male presenting to the ED for nausea.  Patient states he has a history of esophageal cancer.  Its very painful to eat.  He is nauseous because of this.  He feels dehydrated.  He has not been able to tolerate p.o. consistently over the past couple of days.  Called his hematologist oncologist instructed present here.  He states there has been conversations about possible PEG tube, he does not have one at this time.  No fevers or chills.  No chest pain or shortness of breath.  No cough or sputum.  No back pain, no paresthesias, no other symptoms or complaints.    Review of Systems:   Pertinent positives and negatives are stated within HPI, all other systems reviewed and are negative.        --------------------------------------------- PAST HISTORY ---------------------------------------------  Past Medical History:  has a past medical history of Arthritis, Blood circulation, collateral, Cancer (HCC), Chronic kidney disease, COPD (chronic obstructive pulmonary disease) (HCC), GERD (gastroesophageal reflux disease), Liver disease, Lymphadenopathy, and Pneumonia.    Past Surgical History:  has a past surgical history that includes Colon surgery (2011); Colonoscopy; Tonsillectomy; Abdomen surgery; Appendectomy; Endoscopy, colon, diagnostic; and Dilatation, esophagus.    Social History:  reports that he has been smoking. He has a 40.0 pack-year smoking history. He has never used smokeless tobacco. He reports current alcohol use of about 28.0 standard drinks of alcohol per week. He reports that he does not use drugs.    Family History: family history is not on file.. Unless otherwise noted, family history

## 2024-07-12 NOTE — CONSULTS
General Surgery   Consult Note      Patient's Name/Date of Birth: Pito Alarcon / 1960    Date: July 12, 2024     PCP: Rusty Campbell Jr., DO     Chief Complaint: odynphagia and     HPI:   Pito Alarcon is a 63 y.o. 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 Surgery was consulted for PEG placement.     Pt states he has been having odynophagia for 2 months with vomiting after food/liquid intake (unknown if hematemesis or not). He last ate solid food a month ago and has since been on fluids, yogurt and Ensures (max 2/day).   Patient states he lost 12 pounds in the last month and feels weak. Pt visited a GI doctor at [place] and underwent an EGD on 7/3 w/ findings of new esophageal mass. Patient was referred to [facility] where he used to follow for his lung cancer.  He was then referred to [facility] to see a thoracic surgeon for his new esophageal mass.  Patient has still not been able to get in with [facility].  CTA chest yesterday at [facility] shows a soft tissue mass within the mid esophagus. Denies fevers or chills abdominal pain.  Reports having an abdominal surgery in the past for  \"small intestine being twisted\".  Patient does not want to get admitted for a PEG placement if it will not be placed today.         Patient Active Problem List   Diagnosis    Hypocalcemia    Venous insufficiency of both lower extremities    Failure to thrive in adult       No Known Allergies    Past Medical History:   Diagnosis Date    Arthritis     Blood circulation, collateral     Cancer (HCC)     LUNGS, METS TO BRAIN, LIVER, LEGS DX 2016    Chronic kidney disease     COPD (chronic obstructive pulmonary disease) (HCC)     GERD (gastroesophageal reflux disease)     Removed 2 foot if instine    Liver disease     Mets to livewr, lung primary    Lymphadenopathy     Pneumonia      those reviewed.    General ROS: negative obtundation, AMS  Allergy and Immunology ROS: negative itchy/watery eyes or nasal congestion  Hematological and Lymphatic ROS: negative spontaneous bleeding or bruising  Endocrine ROS: negative  lethargy, mood swings, palpitations or polydipsia/polyuria  Respiratory ROS: on 6L NC at home.   Cardiovascular ROS: negative for - loss of consciousness, murmur or orthopnea  Gastrointestinal ROS: negative for - hematochezia or hematemesis  Musculoskeletal ROS: negative for - deformities.   Psych/Neuro ROS: negative for - visual or auditory hallucinations, suicidal ideation      Physical exam:   /80   Pulse 83   Temp 98.2 °F (36.8 °C) (Oral)   Resp 20   Ht 1.803 m (5' 11\")   Wt 63.5 kg (140 lb)   SpO2 100%   BMI 19.53 kg/m²   General appearance:  lying in bed, cachetic, dry mucous membranes   Head: NCAT, PERRL, EOMI.  Neck: supple, no masses, trachea midline  Lungs: on 6L NC.   Heart: warm throughout  Abdomen: soft, non-distended, moderately tender to his epigastrium   Skin: warm and dry, no cyanosis  Gu: no cva tenderness  Extremities: atraumatic, no focal motor deficits, no open wounds  Psych: no tremor, visual hallucinations    Assessment:  Pito Alarcon is a 63 y.o. male Hx of lung cancer with new mets to the esophagus presenting with odynophagia and decreased PO intake. Surgery was consulted for PEG placement.       Patient Active Problem List   Diagnosis    Hypocalcemia    Venous insufficiency of both lower extremities    Failure to thrive in adult       Plan:  - Pt was discharged by ED provider before a formal recommendation from surgery.   - Follow up on Monday 7/15 with Dr. Navarro for PEG tube discussion.       Rosi Milton MD  PGY-1 General Surgery Resident

## 2024-07-24 ENCOUNTER — TELEPHONE (OUTPATIENT)
Dept: SURGERY | Age: 64
End: 2024-07-24

## 2024-07-24 NOTE — TELEPHONE ENCOUNTER
Referral received from the [facility] for patient to have PEG tube insertion done.   Upon chart review, it was noted that patient had previously seen Dr. Navarro for PEG tube insertion. Office contacted and confirmed that patient is scheduled with Dr. Navarro for office visit on 7/29/24 to schedule procedure.  [facility] contacted to inform that appointment is scheduled with outside provider and that [facility] surgery referral to be cancelled. To call us back if anything additional needed for patient.   Electronically signed by Haley Saucedo RN on 7/24/2024 at 3:12 PM